# Patient Record
Sex: FEMALE | Race: WHITE | NOT HISPANIC OR LATINO
[De-identification: names, ages, dates, MRNs, and addresses within clinical notes are randomized per-mention and may not be internally consistent; named-entity substitution may affect disease eponyms.]

---

## 2021-03-30 ENCOUNTER — FORM ENCOUNTER (OUTPATIENT)
Age: 56
End: 2021-03-30

## 2021-04-01 ENCOUNTER — APPOINTMENT (OUTPATIENT)
Dept: DISASTER EMERGENCY | Facility: CLINIC | Age: 56
End: 2021-04-01

## 2021-04-01 ENCOUNTER — OUTPATIENT (OUTPATIENT)
Dept: INPATIENT UNIT | Facility: HOSPITAL | Age: 56
LOS: 1 days | Discharge: HOME | End: 2021-04-01

## 2021-04-01 VITALS
TEMPERATURE: 98 F | HEART RATE: 85 BPM | DIASTOLIC BLOOD PRESSURE: 79 MMHG | OXYGEN SATURATION: 96 % | SYSTOLIC BLOOD PRESSURE: 143 MMHG | RESPIRATION RATE: 18 BRPM

## 2021-04-01 VITALS
DIASTOLIC BLOOD PRESSURE: 71 MMHG | RESPIRATION RATE: 19 BRPM | TEMPERATURE: 99 F | OXYGEN SATURATION: 96 % | SYSTOLIC BLOOD PRESSURE: 148 MMHG | HEART RATE: 92 BPM

## 2021-04-01 PROBLEM — Z00.00 ENCOUNTER FOR PREVENTIVE HEALTH EXAMINATION: Status: ACTIVE | Noted: 2021-04-01

## 2021-04-01 RX ORDER — SODIUM CHLORIDE 9 MG/ML
250 INJECTION INTRAMUSCULAR; INTRAVENOUS; SUBCUTANEOUS
Refills: 0 | Status: DISCONTINUED | OUTPATIENT
Start: 2021-04-01 | End: 2021-04-01

## 2021-04-01 RX ADMIN — SODIUM CHLORIDE 25 MILLILITER(S): 9 INJECTION INTRAMUSCULAR; INTRAVENOUS; SUBCUTANEOUS at 11:21

## 2021-04-01 NOTE — CHART NOTE - NSCHARTNOTEFT_GEN_A_CORE
Medicine Progress Note    Patient is a 56y old  Female who presents with a chief complaint of of being Dx with COVID 19 on 3/29/21. Patient with PMHx of RA, Lymes, OA , HTN, GERD who has been having dizziness with cough and head ache.  Now presents for polyclonal antibody infusion     PAST MEDICAL & SURGICAL HISTORY:  RA  Lymes  PSHx    Back surgery  knee    MEDICATIONS  (STANDING):  casirivimab/imdevimab in sodium chloride 0.9% (EUA) IVPB 250 milliLiter(s) IV Intermittent once  sodium chloride 0.9%. 250 milliLiter(s) (25 mL/Hr) IV Continuous <Continuous>    -Vit D3  Lexapro  Singulair  Famotidine       PHYSICAL EXAM:  CONSTITUTIONAL: NAD, well-developed, well-groomed  RESPIRATORY: Normal respiratory effort; lungs are clear to auscultation bilaterally  CARDIOVASCULAR: Regular rate and rhythm, normal S1 and S2, No lower extremity edema; Peripheral pulses are 2+ bilaterally  ABDOMEN: Nontender to palpation, normoactive bowel sounds  PSYCH: A+O to person, place, and time; affect appropriate  SKIN: No rashes; no palpable lesions    Plan  I have reviewed the Casirivimab/Imdevimab Emergency Use Authorization(EUA) and I have provided the patient or patients care giver with the following information:  1. FDA has authorized emergency use of Casirivimab/Imdevimab which is not an FDA approved biological agent.  2. The patient or patient's caregiver has the option to accept or refuse administration of Casirivimab/Imdevimab.  3. The significant known and potential risks and benefits of Casirivimab/Imdevimab and the extent to which such risks and benefits are unknown.  4. Information on available alternative treatments and risks and benefits of those alternatives  -Informed consent and obtained. Answered all of the patients questions and concerns to  their satisfaction. Patient verbalized understanding  -Monitor Vs per protocol  -Insert peripheral IV  -Infuse NSS 25/mL IV continuously   -Administer casirivimab/imdevimab IV over 1 hour  -Observe patient for 1 hour post transfusion    Disposition  -Patient tolerated infusion well, no complaints of chest pain or sob  -Instructed patient to contact the call center or their PMD if they develop side effects at home  -Instructed the patient to contact 911 and go to the nearest emergency room if they develop side effects at home  -RN educated patient on the proper use of the IS and patient displayed return demonstration  -VSS and RN removed IV line successfully  -Patient was discharged home in NAD Medicine Progress Note    Patient is a 56y old  Female who presents with a chief complaint of of being Dx with COVID 19 on 3/29/21. Patient with PMHx of RA, Lymes, OA , HTN, GERD who has been having dizziness with cough and head ache.  Now presents for polyclonal antibody infusion     PAST MEDICAL & SURGICAL HISTORY:  RA  Lymes  PSHx    Back surgery  knee    MEDICATIONS  (STANDING):  casirivimab/imdevimab in sodium chloride 0.9% (EUA) IVPB 250 milliLiter(s) IV Intermittent once  sodium chloride 0.9%. 250 milliLiter(s) (25 mL/Hr) IV Continuous <Continuous>    -Vit D3  Lexapro  Singulair  Famotidine     148/88   85  18  98.6  98%  PHYSICAL EXAM:  CONSTITUTIONAL: NAD, well-developed, well-groomed  RESPIRATORY: Normal respiratory effort; lungs are clear to auscultation bilaterally  CARDIOVASCULAR: Regular rate and rhythm, normal S1 and S2, No lower extremity edema; Peripheral pulses are 2+ bilaterally  ABDOMEN: Nontender to palpation, normoactive bowel sounds  PSYCH: A+O to person, place, and time; affect appropriate  SKIN: No rashes; no palpable lesions    Plan  I have reviewed the Casirivimab/Imdevimab Emergency Use Authorization(EUA) and I have provided the patient or patients care giver with the following information:  1. FDA has authorized emergency use of Casirivimab/Imdevimab which is not an FDA approved biological agent.  2. The patient or patient's caregiver has the option to accept or refuse administration of Casirivimab/Imdevimab.  3. The significant known and potential risks and benefits of Casirivimab/Imdevimab and the extent to which such risks and benefits are unknown.  4. Information on available alternative treatments and risks and benefits of those alternatives  -Informed consent and obtained. Answered all of the patients questions and concerns to  their satisfaction. Patient verbalized understanding  -Monitor Vs per protocol  -Insert peripheral IV  -Infuse NSS 25/mL IV continuously   -Administer casirivimab/imdevimab IV over 1 hour  -Observe patient for 1 hour post transfusion    Disposition  -Patient tolerated infusion well, no complaints of chest pain or sob  -Instructed patient to contact the call center or their PMD if they develop side effects at home  -Instructed the patient to contact 911 and go to the nearest emergency room if they develop side effects at home  -RN educated patient on the proper use of the IS and patient displayed return demonstration  -VSS and RN removed IV line successfully  -Patient was discharged home in NAD

## 2021-04-04 ENCOUNTER — TRANSCRIPTION ENCOUNTER (OUTPATIENT)
Age: 56
End: 2021-04-04

## 2021-04-07 DIAGNOSIS — U07.1 COVID-19: ICD-10-CM
